# Patient Record
Sex: FEMALE | Race: WHITE | NOT HISPANIC OR LATINO
[De-identification: names, ages, dates, MRNs, and addresses within clinical notes are randomized per-mention and may not be internally consistent; named-entity substitution may affect disease eponyms.]

---

## 2017-03-13 PROBLEM — Z00.00 ENCOUNTER FOR PREVENTIVE HEALTH EXAMINATION: Status: ACTIVE | Noted: 2017-03-13

## 2017-03-28 ENCOUNTER — APPOINTMENT (OUTPATIENT)
Dept: CARDIOLOGY | Facility: CLINIC | Age: 52
End: 2017-03-28

## 2020-01-09 ENCOUNTER — APPOINTMENT (OUTPATIENT)
Dept: OBGYN | Facility: CLINIC | Age: 55
End: 2020-01-09
Payer: COMMERCIAL

## 2020-01-09 VITALS
DIASTOLIC BLOOD PRESSURE: 72 MMHG | HEART RATE: 71 BPM | HEIGHT: 64 IN | WEIGHT: 156 LBS | BODY MASS INDEX: 26.63 KG/M2 | SYSTOLIC BLOOD PRESSURE: 122 MMHG

## 2020-01-09 DIAGNOSIS — Z01.419 ENCOUNTER FOR GYNECOLOGICAL EXAMINATION (GENERAL) (ROUTINE) W/OUT ABNORMAL FINDINGS: ICD-10-CM

## 2020-01-09 DIAGNOSIS — Z86.39 PERSONAL HISTORY OF OTHER ENDOCRINE, NUTRITIONAL AND METABOLIC DISEASE: ICD-10-CM

## 2020-01-09 PROCEDURE — 99396 PREV VISIT EST AGE 40-64: CPT

## 2020-01-09 PROCEDURE — 99213 OFFICE O/P EST LOW 20 MIN: CPT | Mod: 25

## 2020-01-09 NOTE — DISCUSSION/SUMMARY
[FreeTextEntry1] : Patient here for routine gynecologic exam.\par Counseled patient for 15 minutes of visit time. We discussed vaginal atrophy (vaginal dryness associated with menopause). Recommended usage of OTC lubrications - ex: replens/ KY jelly/ vitamin E suppositories. Explained role of estrogen in causing tissue atrophy due to a decrease in estrogen after menopause. This can cause sensation of dryness on daily basis as well as pain during intercourse. Also informed about various estrogen preparations available such as vagifem - least absorbed - to help with atrophy.\par

## 2020-01-09 NOTE — CHIEF COMPLAINT
[Annual Visit] : annual visit [FreeTextEntry1] : Patient is here for annual exam ,up to date with PE , blood work , hx fibroid uterus, mammography due now , patient denies pelvic pain ,vaginal bleeding.

## 2020-01-09 NOTE — HISTORY OF PRESENT ILLNESS
[Menarche Age: ____] : age at menarche was [unfilled] [Menopause Age: ____] : age at menopause was [unfilled] [Sexually Active] : is sexually active [NA] : N/A [Male ___] : [unfilled] male [Contraception] : does not use contraception

## 2020-01-13 LAB — HPV HIGH+LOW RISK DNA PNL CVX: NOT DETECTED

## 2020-01-22 ENCOUNTER — APPOINTMENT (OUTPATIENT)
Dept: OBGYN | Facility: CLINIC | Age: 55
End: 2020-01-22
Payer: COMMERCIAL

## 2020-01-22 DIAGNOSIS — Z78.0 ASYMPTOMATIC MENOPAUSAL STATE: ICD-10-CM

## 2020-01-22 PROCEDURE — 99213 OFFICE O/P EST LOW 20 MIN: CPT

## 2020-01-22 PROCEDURE — 58999 UNLISTED PX FML GENITAL SYS: CPT

## 2020-01-22 NOTE — DISCUSSION/SUMMARY
[FreeTextEntry1] : Patient tolerated procedure well.\par No complications.\par Written post procedure instructions given.\par \par Follow up in 6 weeks.\par \par Monique Tyson M.D.\par

## 2020-02-13 NOTE — PHYSICAL EXAM
Patient is currently on Eliquis. [Alert] : alert [Awake] : awake [Acute Distress] : no acute distress [Mass] : no breast mass [Nipple Discharge] : no nipple discharge [Axillary LAD] : no axillary lymphadenopathy [Soft] : soft [Tender] : non tender [Vulvar Atrophy] : vulvar atrophy [Oriented x3] : oriented to person, place, and time [Normal] : vagina [Atrophy] : atrophy [Cystocele] : a cystocele [Rectocele] : a rectocele [No Bleeding] : there was no active vaginal bleeding [Enlarged ___ wks] : enlarged [unfilled] ~Uweeks [Uterine Adnexae] : were not tender and not enlarged [External Hemorrhoid] : an external hemorrhoid [No Tenderness] : no rectal tenderness

## 2020-03-05 ENCOUNTER — APPOINTMENT (OUTPATIENT)
Dept: OBGYN | Facility: CLINIC | Age: 55
End: 2020-03-05
Payer: COMMERCIAL

## 2020-03-05 PROCEDURE — 58999 UNLISTED PX FML GENITAL SYS: CPT

## 2020-03-05 NOTE — DISCUSSION/SUMMARY
[FreeTextEntry1] : Patient here for second Fatou Mei\par No complications at the time of procedure.\par \par Monique Tyson M.D.\par

## 2020-04-16 ENCOUNTER — APPOINTMENT (OUTPATIENT)
Dept: OBGYN | Facility: CLINIC | Age: 55
End: 2020-04-16

## 2020-09-24 ENCOUNTER — APPOINTMENT (OUTPATIENT)
Dept: OBGYN | Facility: CLINIC | Age: 55
End: 2020-09-24
Payer: COMMERCIAL

## 2020-09-24 DIAGNOSIS — N89.8 OTHER SPECIFIED NONINFLAMMATORY DISORDERS OF VAGINA: ICD-10-CM

## 2020-09-24 PROCEDURE — 58999 UNLISTED PX FML GENITAL SYS: CPT

## 2020-09-24 NOTE — PLAN
[FreeTextEntry1] : Patient here for Fatou Hurley #3\par Post procedure instructions given.\par \par Monique Tyson M.D.\par

## 2020-12-23 PROBLEM — Z01.419 ENCOUNTER FOR ANNUAL ROUTINE GYNECOLOGICAL EXAMINATION: Status: RESOLVED | Noted: 2020-01-09 | Resolved: 2020-12-23

## 2022-06-08 ENCOUNTER — OUTPATIENT (OUTPATIENT)
Dept: OUTPATIENT SERVICES | Facility: HOSPITAL | Age: 57
LOS: 1 days | Discharge: HOME | End: 2022-06-08
Payer: COMMERCIAL

## 2022-06-08 DIAGNOSIS — I44.7 LEFT BUNDLE-BRANCH BLOCK, UNSPECIFIED: ICD-10-CM

## 2022-06-08 PROCEDURE — 75561 CARDIAC MRI FOR MORPH W/DYE: CPT | Mod: 26

## 2022-08-19 ENCOUNTER — APPOINTMENT (OUTPATIENT)
Dept: ORTHOPEDIC SURGERY | Facility: CLINIC | Age: 57
End: 2022-08-19

## 2022-08-19 VITALS — WEIGHT: 155 LBS | HEIGHT: 64 IN | BODY MASS INDEX: 26.46 KG/M2

## 2022-08-19 DIAGNOSIS — M25.562 PAIN IN LEFT KNEE: ICD-10-CM

## 2022-08-19 DIAGNOSIS — S83.232A COMPLEX TEAR OF MEDIAL MENISCUS, CURRENT INJURY, LEFT KNEE, INITIAL ENCOUNTER: ICD-10-CM

## 2022-08-19 PROCEDURE — 73562 X-RAY EXAM OF KNEE 3: CPT | Mod: 50

## 2022-08-19 PROCEDURE — 99203 OFFICE O/P NEW LOW 30 MIN: CPT

## 2022-08-19 NOTE — PHYSICAL EXAM
[Bilateral] : knee bilaterally [] : patient ambulates with assistive device [FreeTextEntry3] : Mild effusion left knee [FreeTextEntry8] :  Tenderness to palpation over the medial joint line of the left knee.  No tenderness to palpation over the lateral joint line of the left knee.  No tenderness to palpation over the anterior aspect of the left knee.  No tenderness to palpation over the medial or lateral joint lines of the right knee.  She has mild tenderness to palpation over the anterior aspect of the right knee. [de-identified] :   Positive Isacc's left knee.

## 2022-08-19 NOTE — HISTORY OF PRESENT ILLNESS
[de-identified] : The patient is a 57 year old female here for an evaluation of both knees.Regarding her left knee, 3 days ago she was working out and felt a pop in her left knee.  She developed swelling.  Points posteriorly and medially as to where the pain is.  She also has tightness in the knee.  History of a right knee arthroscopy with Dr. Crouch in 2016, she feels some soreness in the knee.

## 2022-08-19 NOTE — PHYSICAL EXAM
[Bilateral] : knee bilaterally [] : patient ambulates with assistive device [FreeTextEntry3] : Mild effusion left knee [FreeTextEntry8] :  Tenderness to palpation over the medial joint line of the left knee.  No tenderness to palpation over the lateral joint line of the left knee.  No tenderness to palpation over the anterior aspect of the left knee.  No tenderness to palpation over the medial or lateral joint lines of the right knee.  She has mild tenderness to palpation over the anterior aspect of the right knee. [de-identified] :   Positive Isacc's left knee.

## 2022-08-19 NOTE — DATA REVIEWED
[Bilateral] : of the bilateral [Knee] : knee [FreeTextEntry1] :   Three views of each knee were obtained:  On the AP view she has well-preserved medial and lateral compartments of each knee.  On the lateral view:  There are well-preserved patellofemoral compartments, there is an enthesophyte noted at the superior aspect of the left patella.

## 2022-08-19 NOTE — DISCUSSION/SUMMARY
[de-identified] :   I reviewed the x-ray findings with the patient.  She will start home therapy exercises for both knees.  Regarding her left knee, please send authorization for MRI left knee to evaluate for medial meniscus tear.  She has an effusion here on exam, medial joint tenderness to palpation, positive Isacc's with x-rays show well-preserved medial and lateral compartments of the left knee.  She may continue taking Advil for pain.  She will call me 2 days after the MRI is performed so we can discuss results, I will see her back in 6 weeks for further evaluation.\par \par Supervising physician:  Dr. Molina LISSA Ellington

## 2022-08-19 NOTE — DISCUSSION/SUMMARY
[de-identified] :   I reviewed the x-ray findings with the patient.  She will start home therapy exercises for both knees.  Regarding her left knee, please send authorization for MRI left knee to evaluate for medial meniscus tear.  She has an effusion here on exam, medial joint tenderness to palpation, positive Isacc's with x-rays show well-preserved medial and lateral compartments of the left knee.  She may continue taking Advil for pain.  She will call me 2 days after the MRI is performed so we can discuss results, I will see her back in 6 weeks for further evaluation.\par \par Supervising physician:  Dr. Molina

## 2022-08-19 NOTE — HISTORY OF PRESENT ILLNESS
[de-identified] : The patient is a 57 year old female here for an evaluation of both knees.Regarding her left knee, 3 days ago she was working out and felt a pop in her left knee.  She developed swelling.  Points posteriorly and medially as to where the pain is.  She also has tightness in the knee.  History of a right knee arthroscopy with Dr. Crouch in 2016, she feels some soreness in the knee.

## 2022-08-26 ENCOUNTER — APPOINTMENT (OUTPATIENT)
Dept: MRI IMAGING | Facility: CLINIC | Age: 57
End: 2022-08-26

## 2022-08-26 PROCEDURE — 73721 MRI JNT OF LWR EXTRE W/O DYE: CPT | Mod: LT

## 2022-09-26 ENCOUNTER — APPOINTMENT (OUTPATIENT)
Dept: ORTHOPEDIC SURGERY | Facility: CLINIC | Age: 57
End: 2022-09-26

## 2022-10-10 ENCOUNTER — OUTPATIENT (OUTPATIENT)
Dept: OUTPATIENT SERVICES | Facility: HOSPITAL | Age: 57
LOS: 1 days | Discharge: HOME | End: 2022-10-10

## 2022-10-10 DIAGNOSIS — R79.89 OTHER SPECIFIED ABNORMAL FINDINGS OF BLOOD CHEMISTRY: ICD-10-CM

## 2022-10-10 PROCEDURE — 76700 US EXAM ABDOM COMPLETE: CPT | Mod: 26

## 2022-11-07 ENCOUNTER — APPOINTMENT (OUTPATIENT)
Dept: ORTHOPEDIC SURGERY | Facility: CLINIC | Age: 57
End: 2022-11-07

## 2022-11-07 PROCEDURE — 99213 OFFICE O/P EST LOW 20 MIN: CPT

## 2022-11-07 NOTE — DISCUSSION/SUMMARY
[de-identified] : At this point she may continue home therapy exercises.  I will see her back in 3 months for further evaluation, if her symptoms worsen prior to that she will call me sooner.\par \par Supervising physician:  Dr. Molina

## 2022-11-07 NOTE — HISTORY OF PRESENT ILLNESS
[de-identified] : The patient is a 57 year old female here for a subsequent re-evaluation of both knees.  The left knee MRI showed a medial meniscus tear.  She has a history of a right knee arthroscopy in 2016 with Dr. Crouch.  Both knees are doing well, no pain.  She is doing therapy exercises at the gym.

## 2022-11-07 NOTE — PHYSICAL EXAM
[Bilateral] : knee bilaterally [NL (0)] : extension 0 degrees [Negative] : negative Isacc's [] : non-antalgic [TWNoteComboBox7] : flexion 130 degrees

## 2022-11-14 ENCOUNTER — APPOINTMENT (OUTPATIENT)
Dept: CARDIOLOGY | Facility: CLINIC | Age: 57
End: 2022-11-14

## 2022-11-14 VITALS
WEIGHT: 161.19 LBS | TEMPERATURE: 97.1 F | BODY MASS INDEX: 27.52 KG/M2 | HEIGHT: 64 IN | HEART RATE: 76 BPM | SYSTOLIC BLOOD PRESSURE: 133 MMHG | DIASTOLIC BLOOD PRESSURE: 84 MMHG

## 2022-11-14 DIAGNOSIS — Z83.3 FAMILY HISTORY OF DIABETES MELLITUS: ICD-10-CM

## 2022-11-14 DIAGNOSIS — Z82.0 FAMILY HISTORY OF EPILEPSY AND OTHER DISEASES OF THE NERVOUS SYSTEM: ICD-10-CM

## 2022-11-14 DIAGNOSIS — Z78.9 OTHER SPECIFIED HEALTH STATUS: ICD-10-CM

## 2022-11-14 DIAGNOSIS — Z80.1 FAMILY HISTORY OF MALIGNANT NEOPLASM OF TRACHEA, BRONCHUS AND LUNG: ICD-10-CM

## 2022-11-14 DIAGNOSIS — Z82.49 FAMILY HISTORY OF ISCHEMIC HEART DISEASE AND OTHER DISEASES OF THE CIRCULATORY SYSTEM: ICD-10-CM

## 2022-11-14 PROCEDURE — 99204 OFFICE O/P NEW MOD 45 MIN: CPT | Mod: 25

## 2022-11-14 PROCEDURE — 93000 ELECTROCARDIOGRAM COMPLETE: CPT

## 2022-11-14 RX ORDER — NAPROXEN 500 MG/1
500 TABLET ORAL
Qty: 14 | Refills: 0 | Status: COMPLETED | COMMUNITY
Start: 2022-08-17 | End: 2022-11-14

## 2022-11-14 RX ORDER — SIMVASTATIN 10 MG/1
10 TABLET, FILM COATED ORAL
Refills: 0 | Status: COMPLETED | COMMUNITY
End: 2022-11-14

## 2022-11-14 RX ORDER — PREDNISONE 50 MG/1
50 TABLET ORAL
Qty: 5 | Refills: 0 | Status: COMPLETED | COMMUNITY
Start: 2022-10-27 | End: 2022-11-14

## 2022-11-14 RX ORDER — ALPRAZOLAM 0.5 MG/1
0.5 TABLET ORAL
Qty: 20 | Refills: 0 | Status: COMPLETED | COMMUNITY
Start: 2022-05-19 | End: 2022-11-14

## 2022-11-14 RX ORDER — LIDOCAINE 5% 700 MG/1
5 PATCH TOPICAL
Qty: 30 | Refills: 0 | Status: COMPLETED | COMMUNITY
Start: 2022-08-17 | End: 2022-11-14

## 2022-11-15 ENCOUNTER — OUTPATIENT (OUTPATIENT)
Dept: OUTPATIENT SERVICES | Facility: HOSPITAL | Age: 57
LOS: 1 days | Discharge: HOME | End: 2022-11-15

## 2022-11-15 DIAGNOSIS — I25.10 ATHEROSCLEROTIC HEART DISEASE OF NATIVE CORONARY ARTERY WITHOUT ANGINA PECTORIS: ICD-10-CM

## 2022-11-15 PROCEDURE — 75574 CT ANGIO HRT W/3D IMAGE: CPT | Mod: 26

## 2022-11-15 NOTE — DISCUSSION/SUMMARY
[EKG obtained to assist in diagnosis and management of assessed problem(s)] : EKG obtained to assist in diagnosis and management of assessed problem(s) [FreeTextEntry1] : Ms. Haley Chavez is a pleasant 57 year old woman with hyperlipidemia, left bundle branch block, COVID infection in December 2021 and October 2022, and abnormal MRI of the heart. Patient has no prior syncope and no family history of sudden cardiac death. For part of her workup for left bundle branch block she had a cardiac MRI which showed mid myocardial LGE in the lateral wall of the LV. \par \par I recommend to continue Simvastatin for her hyperlipidemia. \par \par I recommend a myocardial PET scan to evaluate for FDG uptake and the possibility of cardiac sarcoidosis. If PET scan is negative, etiology of mid myocardial LGE is likely an old myocarditis. If PET scan is positive, I will refer patient for a cardiac sarcoid specialist.\par \par I discussed with patient at length the implications of the abnormal MRI findings and answered all of her questions. \par \par I discussed with patient plan of care in great details. I answered all her questions to her satisfaction. Patient was pleased with the visit.\par \par Patient will follow with me in 2 months’ time. Please do not hesitate to contact me at [phone] if you have any further questions regarding this patient care.

## 2022-11-15 NOTE — CARDIOLOGY SUMMARY
[de-identified] :  (11/14/2022) ECG sinus rhythm at 76 bpm, left bundle branch block at 138 milliseconds. \par  [de-identified] : (06/08/2022) Normal biventricular function and volume, heterogeneous mid myocardial delayed enhancement within the lateral wall of the left ventricle which may be seen in infiltrative disease or previous myocarditis.

## 2022-11-15 NOTE — REASON FOR VISIT
[Arrhythmia/ECG Abnorrmalities] : arrhythmia/ECG abnormalities [FreeTextEntry3] : Dr. Birgit New - Dr. Jhonny Lucero

## 2022-11-15 NOTE — END OF VISIT
[Time Spent: ___ minutes] : I have spent [unfilled] minutes of time on the encounter. [FreeTextEntry3] : I, Bg Medeiros, personally performed the services described in this documentation. All medical record entries made by the scribe/nurse CTA were at my direction and in my presence. I have reviewed the chart and agree that the record reflects my personal performance and is accurate and complete.\par

## 2022-11-15 NOTE — HISTORY OF PRESENT ILLNESS
[FreeTextEntry1] : Hyperlipidemia, left bundle branch block, history of COVID infection in December 2021 and October 2022 (both mild infections), abnormal CMR (mid LGE in the lateral wall).\par \par Patient has a longstanding left bundle branch block, stable, with normal EF. She has no prior syncope. For part of her workup for left bundle branch block she underwent cardiac MRI which showed mid myocardial late gadolinium enhancement in the lateral wall.\par \par Patient has no chest pain, no dyspnea on exertion, no shortness of breath at rest, no dizziness, no lightheadedness, and no syncope. \par \par She exercises regularly, she does 4-6 times 1 hour at [employer]. \par \par She presents for evaluation.

## 2022-11-15 NOTE — ADDENDUM
[FreeTextEntry1] : Sophie ROMAN assisted in documentation on 11/14/2022   acting as a scribe for Dr. Sandy Medeiros.\par

## 2022-12-13 ENCOUNTER — RESULT REVIEW (OUTPATIENT)
Age: 57
End: 2022-12-13

## 2022-12-13 ENCOUNTER — OUTPATIENT (OUTPATIENT)
Dept: OUTPATIENT SERVICES | Facility: HOSPITAL | Age: 57
LOS: 1 days | Discharge: HOME | End: 2022-12-13

## 2022-12-13 DIAGNOSIS — I44.7 LEFT BUNDLE-BRANCH BLOCK, UNSPECIFIED: ICD-10-CM

## 2022-12-13 DIAGNOSIS — R93.89 ABNORMAL FINDINGS ON DIAGNOSTIC IMAGING OF OTHER SPECIFIED BODY STRUCTURES: ICD-10-CM

## 2022-12-13 PROCEDURE — 78451 HT MUSCLE IMAGE SPECT SING: CPT | Mod: 26

## 2022-12-21 ENCOUNTER — RESULT REVIEW (OUTPATIENT)
Age: 57
End: 2022-12-21

## 2022-12-21 ENCOUNTER — OUTPATIENT (OUTPATIENT)
Dept: OUTPATIENT SERVICES | Facility: HOSPITAL | Age: 57
LOS: 1 days | Discharge: HOME | End: 2022-12-21

## 2022-12-21 DIAGNOSIS — I44.7 LEFT BUNDLE-BRANCH BLOCK, UNSPECIFIED: ICD-10-CM

## 2022-12-21 DIAGNOSIS — R07.9 CHEST PAIN, UNSPECIFIED: ICD-10-CM

## 2022-12-21 LAB — GLUCOSE BLDC GLUCOMTR-MCNC: 94 MG/DL — SIGNIFICANT CHANGE UP (ref 70–99)

## 2022-12-21 PROCEDURE — 78815 PET IMAGE W/CT SKULL-THIGH: CPT | Mod: 26

## 2022-12-21 PROCEDURE — 71046 X-RAY EXAM CHEST 2 VIEWS: CPT | Mod: 26

## 2023-02-07 ENCOUNTER — APPOINTMENT (OUTPATIENT)
Dept: ORTHOPEDIC SURGERY | Facility: CLINIC | Age: 58
End: 2023-02-07
Payer: COMMERCIAL

## 2023-02-07 DIAGNOSIS — S83.232D COMPLEX TEAR OF MEDIAL MENISCUS, CURRENT INJURY, LEFT KNEE, SUBSEQUENT ENCOUNTER: ICD-10-CM

## 2023-02-07 DIAGNOSIS — M25.561 PAIN IN RIGHT KNEE: ICD-10-CM

## 2023-02-07 PROCEDURE — 99213 OFFICE O/P EST LOW 20 MIN: CPT

## 2023-02-07 NOTE — DISCUSSION/SUMMARY
[de-identified] : At this point the patient is doing very well.  She is back in the gym exercising.  She can see me back on a as needed basis for each knee.\par \par Supervising physician:

## 2023-02-07 NOTE — PHYSICAL EXAM
[Bilateral] : knee bilaterally [NL (140)] : flexion 140 degrees [NL (0)] : extension 0 degrees [Negative] : negative Isacc's [] : non-antalgic [FreeTextEntry8] :   Mild medial joint line tenderness to palpation of each knee.

## 2023-02-07 NOTE — HISTORY OF PRESENT ILLNESS
[de-identified] :  The patient is a 57 year old female here for subsequent re-evaluation of both knees.  Her knees are doing well, she is back in the gym exercising.  Her pain is well controlled.  No instability.

## 2023-03-22 ENCOUNTER — APPOINTMENT (OUTPATIENT)
Dept: PULMONOLOGY | Facility: CLINIC | Age: 58
End: 2023-03-22
Payer: COMMERCIAL

## 2023-03-22 VITALS
HEART RATE: 77 BPM | DIASTOLIC BLOOD PRESSURE: 80 MMHG | HEIGHT: 64 IN | OXYGEN SATURATION: 99 % | SYSTOLIC BLOOD PRESSURE: 140 MMHG | BODY MASS INDEX: 28.17 KG/M2 | WEIGHT: 165 LBS | RESPIRATION RATE: 12 BRPM

## 2023-03-22 PROCEDURE — 94010 BREATHING CAPACITY TEST: CPT

## 2023-03-22 PROCEDURE — 99203 OFFICE O/P NEW LOW 30 MIN: CPT | Mod: 25

## 2023-03-22 NOTE — ASSESSMENT
[FreeTextEntry1] : 2 mm lung nodule.  Low risk patient \par HO 2.5 PY smoking quit 5 years ago\par Family HO lung cancer\par NO clinical no radiological evidence of pulmonary sarcoidosis

## 2023-04-03 ENCOUNTER — APPOINTMENT (OUTPATIENT)
Dept: ELECTROPHYSIOLOGY | Facility: CLINIC | Age: 58
End: 2023-04-03
Payer: COMMERCIAL

## 2023-04-03 VITALS
HEIGHT: 64 IN | HEART RATE: 60 BPM | BODY MASS INDEX: 28.17 KG/M2 | TEMPERATURE: 97.1 F | DIASTOLIC BLOOD PRESSURE: 94 MMHG | RESPIRATION RATE: 16 BRPM | SYSTOLIC BLOOD PRESSURE: 150 MMHG | WEIGHT: 165 LBS

## 2023-04-03 DIAGNOSIS — E78.49 OTHER HYPERLIPIDEMIA: ICD-10-CM

## 2023-04-03 DIAGNOSIS — I44.7 LEFT BUNDLE-BRANCH BLOCK, UNSPECIFIED: ICD-10-CM

## 2023-04-03 DIAGNOSIS — R93.89 ABNORMAL FINDINGS ON DIAGNOSTIC IMAGING OF OTHER SPECIFIED BODY STRUCTURES: ICD-10-CM

## 2023-04-03 PROCEDURE — 93000 ELECTROCARDIOGRAM COMPLETE: CPT

## 2023-04-03 PROCEDURE — 99214 OFFICE O/P EST MOD 30 MIN: CPT | Mod: 25

## 2023-04-03 RX ORDER — METOPROLOL TARTRATE 25 MG/1
25 TABLET, FILM COATED ORAL
Qty: 2 | Refills: 0 | Status: COMPLETED | COMMUNITY
Start: 2022-11-08 | End: 2023-04-03

## 2023-04-03 RX ORDER — VALACYCLOVIR 1 G/1
1 TABLET, FILM COATED ORAL
Qty: 4 | Refills: 0 | Status: COMPLETED | COMMUNITY
Start: 2022-11-26 | End: 2023-04-03

## 2023-04-03 RX ORDER — AZITHROMYCIN 250 MG/1
250 TABLET, FILM COATED ORAL
Qty: 6 | Refills: 0 | Status: COMPLETED | COMMUNITY
Start: 2022-12-05 | End: 2023-04-03

## 2023-04-03 RX ORDER — SIMVASTATIN 20 MG/1
20 TABLET, FILM COATED ORAL DAILY
Refills: 0 | Status: ACTIVE | COMMUNITY

## 2023-04-03 NOTE — CARDIOLOGY SUMMARY
[de-identified] : (4/3/2023) ECG sinus rhythm at 60 bpm, left bundle branch block at 144 milliseconds. \par (11/14/2022) ECG sinus rhythm at 76 bpm, left bundle branch block at 138 milliseconds.  [de-identified] : (06/08/2022) Normal biventricular function and volume, heterogeneous mid myocardial delayed enhancement within the lateral wall of the left ventricle which may be seen in infiltrative disease or previous myocarditis.  [de-identified] : (12/21/2022) PET scan negative: no focal FDG uptake\par \par

## 2023-04-03 NOTE — HISTORY OF PRESENT ILLNESS
[FreeTextEntry1] : Hyperlipidemia, left bundle branch block, history of COVID infection in December 2021 and October 2022 (both mild infections), abnormal CMR (mid LGE in the lateral wall).\par \par Patient has a longstanding left bundle branch block, stable, with normal EF. She has no prior syncope. For part of her workup for left bundle branch block she underwent cardiac MRI which showed mid myocardial late gadolinium enhancement in the lateral wall.\par \par Patient has no chest pain, no dyspnea on exertion, no shortness of breath at rest, no dizziness, no lightheadedness, and no syncope. \par \par She exercises regularly, she does 4-6 times 1 hour at [employer]. \par \par seen in Nov 2022 and referred for PET - PET no FDG uptake\par \par She presents for evaluation.

## 2023-04-03 NOTE — DISCUSSION/SUMMARY
[FreeTextEntry1] : Ms. Haley Chavez is a pleasant 57 year old woman with hyperlipidemia, left bundle branch block, COVID infection in December 2021 and October 2022, and abnormal MRI of the heart. Patient has no prior syncope and no family history of sudden cardiac death. For part of her workup for left bundle branch block she had a cardiac MRI which showed mid myocardial LGE in the lateral wall of the LV. PET scan shows no FDG uptake. \par \par I recommend to continue Simvastatin for her hyperlipidemia. \par \par I reviewed result of myocardial PET scan: No FDG uptake, etiology of mid myocardial LGE is likely an old myocarditis. \par \par I recommend yearly echo with Dr. Luecro.\par \par I recommend repeat CMR in 2 years.\par \par I discussed with patient plan of care in great details. I answered all her questions to her satisfaction. Patient was pleased with the visit.\par \par Patient will follow with his cardiologist. Please do not hesitate to contact me at [phone] if you have any further questions regarding this patient care. [EKG obtained to assist in diagnosis and management of assessed problem(s)] : EKG obtained to assist in diagnosis and management of assessed problem(s)

## 2023-04-28 ENCOUNTER — APPOINTMENT (OUTPATIENT)
Dept: PULMONOLOGY | Facility: HOSPITAL | Age: 58
End: 2023-04-28
Payer: COMMERCIAL

## 2023-04-28 ENCOUNTER — OUTPATIENT (OUTPATIENT)
Dept: OUTPATIENT SERVICES | Facility: HOSPITAL | Age: 58
LOS: 1 days | End: 2023-04-28
Payer: COMMERCIAL

## 2023-04-28 DIAGNOSIS — R06.02 SHORTNESS OF BREATH: ICD-10-CM

## 2023-04-28 PROCEDURE — 94060 EVALUATION OF WHEEZING: CPT | Mod: 26

## 2023-04-28 PROCEDURE — 94729 DIFFUSING CAPACITY: CPT

## 2023-04-28 PROCEDURE — 94727 GAS DIL/WSHOT DETER LNG VOL: CPT

## 2023-04-28 PROCEDURE — 94727 GAS DIL/WSHOT DETER LNG VOL: CPT | Mod: 26

## 2023-04-28 PROCEDURE — 94070 EVALUATION OF WHEEZING: CPT

## 2023-04-28 PROCEDURE — 94729 DIFFUSING CAPACITY: CPT | Mod: 26

## 2023-04-29 DIAGNOSIS — R06.02 SHORTNESS OF BREATH: ICD-10-CM

## 2024-01-02 ENCOUNTER — NON-APPOINTMENT (OUTPATIENT)
Age: 59
End: 2024-01-02

## 2024-01-03 ENCOUNTER — APPOINTMENT (OUTPATIENT)
Dept: PULMONOLOGY | Facility: CLINIC | Age: 59
End: 2024-01-03
Payer: COMMERCIAL

## 2024-01-03 VITALS
DIASTOLIC BLOOD PRESSURE: 60 MMHG | HEIGHT: 64 IN | SYSTOLIC BLOOD PRESSURE: 100 MMHG | WEIGHT: 162 LBS | RESPIRATION RATE: 12 BRPM | OXYGEN SATURATION: 96 % | BODY MASS INDEX: 27.66 KG/M2 | HEART RATE: 79 BPM

## 2024-01-03 PROCEDURE — 99213 OFFICE O/P EST LOW 20 MIN: CPT

## 2024-01-03 NOTE — ASSESSMENT
[FreeTextEntry1] : 2 mm lung nodule. Low risk patient HO 2.5 PY smoking quit 5 years ago Family HO lung cancer No clinical no radiological evidence of pulmonary sarcoidosis.

## 2024-01-24 ENCOUNTER — RESULT REVIEW (OUTPATIENT)
Age: 59
End: 2024-01-24

## 2024-01-24 ENCOUNTER — OUTPATIENT (OUTPATIENT)
Dept: OUTPATIENT SERVICES | Facility: HOSPITAL | Age: 59
LOS: 1 days | End: 2024-01-24
Payer: COMMERCIAL

## 2024-01-24 DIAGNOSIS — R91.1 SOLITARY PULMONARY NODULE: ICD-10-CM

## 2024-01-24 PROCEDURE — 71250 CT THORAX DX C-: CPT | Mod: 26

## 2024-01-24 PROCEDURE — 71250 CT THORAX DX C-: CPT

## 2024-01-25 DIAGNOSIS — R91.1 SOLITARY PULMONARY NODULE: ICD-10-CM

## 2024-06-06 ENCOUNTER — APPOINTMENT (OUTPATIENT)
Dept: PULMONOLOGY | Facility: CLINIC | Age: 59
End: 2024-06-06
Payer: COMMERCIAL

## 2024-06-06 DIAGNOSIS — R91.1 SOLITARY PULMONARY NODULE: ICD-10-CM

## 2024-06-06 PROCEDURE — 99213 OFFICE O/P EST LOW 20 MIN: CPT

## 2024-06-06 NOTE — ASSESSMENT
[FreeTextEntry1] : 5 mm lung nodule seen on CTA heart.  Stable on CT chest Low risk patient HO 2.5 PY smoking quit 5 years ago Family HO lung cancer No clinical nor radiological evidence of pulmonary sarcoidosis.

## 2025-01-13 ENCOUNTER — APPOINTMENT (OUTPATIENT)
Dept: PULMONOLOGY | Facility: CLINIC | Age: 60
End: 2025-01-13

## 2025-01-29 ENCOUNTER — OUTPATIENT (OUTPATIENT)
Dept: OUTPATIENT SERVICES | Facility: HOSPITAL | Age: 60
LOS: 1 days | End: 2025-01-29
Payer: COMMERCIAL

## 2025-01-29 ENCOUNTER — RESULT REVIEW (OUTPATIENT)
Age: 60
End: 2025-01-29

## 2025-01-29 DIAGNOSIS — Z00.8 ENCOUNTER FOR OTHER GENERAL EXAMINATION: ICD-10-CM

## 2025-01-29 DIAGNOSIS — R91.1 SOLITARY PULMONARY NODULE: ICD-10-CM

## 2025-01-29 PROCEDURE — 71250 CT THORAX DX C-: CPT | Mod: 26

## 2025-01-29 PROCEDURE — 71250 CT THORAX DX C-: CPT

## 2025-01-30 DIAGNOSIS — R91.1 SOLITARY PULMONARY NODULE: ICD-10-CM
